# Patient Record
Sex: FEMALE | Race: OTHER
[De-identification: names, ages, dates, MRNs, and addresses within clinical notes are randomized per-mention and may not be internally consistent; named-entity substitution may affect disease eponyms.]

---

## 2018-12-13 ENCOUNTER — HOSPITAL ENCOUNTER (EMERGENCY)
Dept: HOSPITAL 94 - ER | Age: 38
Discharge: LEFT BEFORE DISCHARGE | End: 2018-12-13

## 2018-12-13 DIAGNOSIS — Z53.21: ICD-10-CM

## 2018-12-13 DIAGNOSIS — R42: Primary | ICD-10-CM

## 2018-12-13 NOTE — NUR
NO RESPONSE FROM [facility] AFTER 3 CALL BACKS, CALL PLACED TO NUMBER ON FILE. SPOKE 
WITH PIYUSH, WHO IS CURRENLY AT [facility] BEING TREATED. DR. KANG INFORMED.